# Patient Record
(demographics unavailable — no encounter records)

---

## 2024-10-24 NOTE — RESULTS/DATA
[TextEntry] : CT scan performed 10/12/2024 images and radiologist read reviewed, by my read demonstrating trace to small bilateral pleural effusions with associated basilar atelectasis.  There is also a mildly dilated pulmonary artery measuring up to 3.5 cm.  No evidence of pulmonary embolism.  Cardiomegaly.  Otherwise fairly normal-appearing pulmonary parenchyma bilaterally, very mild mosaicism throughout.

## 2024-10-24 NOTE — PHYSICAL EXAM
[No Acute Distress] : no acute distress [Normal Oropharynx] : normal oropharynx [Normal Appearance] : normal appearance [No Neck Mass] : no neck mass [Normal Rate/Rhythm] : normal rate/rhythm [Normal S1, S2] : normal s1, s2 [No Murmurs] : no murmurs [No Resp Distress] : no resp distress [No Abnormalities] : no abnormalities [Benign] : benign [Normal Gait] : normal gait [No Clubbing] : no clubbing [No Cyanosis] : no cyanosis [FROM] : FROM [2+ Pitting] : 2+ pitting [Normal Color/ Pigmentation] : normal color/ pigmentation [No Focal Deficits] : no focal deficits [Oriented x3] : oriented x3 [Normal Affect] : normal affect [TextBox_68] : R-sided expiratory squeak

## 2024-10-24 NOTE — HISTORY OF PRESENT ILLNESS
[Never] : never [TextBox_4] : Mr. LORENZO is a 58 year man with a medical history significant for ?COPD (no smoking history) and diastolic heart failure presenting today to the clinic for new oxygen dependence.    Patient recently presented to the hospital with worsening dyspnea on exertion and lower extremity edema.  In the emergency department he was found to have bilateral pleural effusions on imaging and elevated serum troponin, and BNP >5k.  Patient was found to have severe hypercapnia as well with evidence of chronic compensation.  He is now following with pulmonary for his chronic oxygen requirements and likely OHS/PARIS given his chronic hypercapnia.  Has changed his diet to bite size and is no longer choking on his food at all.  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 	( x ) Tiredness during the day? 	(  ) Observed overnight apnea? 	( x ) Pressure elevated? 	(  ) BMI > 35? 	( x ) Age > 50? 	(  ) Neck circumference >16in? 	( x ) Gender = male?

## 2024-11-07 NOTE — HISTORY OF PRESENT ILLNESS
[FreeTextEntry1] : The patient is referred to cardiology for treatment of hypertension. He was under the care of Dr. Santos which recently passed away. The patient denies exertional chest pain or exertional shortness of breath. He denies atherosclerotic heart disease, valvular heart disease or cardiomyopathy. He has risk factors for ischemic heart disease including COPD, smoking and history of hypertension. He denies TIA, CVA or PAD.  EKG is normal sinus rhythm

## 2024-11-07 NOTE — DISCUSSION/SUMMARY
[FreeTextEntry1] : Return visit in 1 month Start Hydralazine 100 mg twice a day DC verapamil 2D echo in the future.
[FreeTextEntry1] : Return visit in 1 month Start Hydralazine 100 mg twice a day DC verapamil 2D echo in the future.
7.5

## 2025-01-06 NOTE — HISTORY OF PRESENT ILLNESS
[Never] : never [TextBox_4] : Mr. LORENZO is a 58 year man with a medical history significant for ?COPD (no smoking history) and diastolic heart failure presenting today to the clinic for new oxygen dependence.    Patient recently presented to the hospital with worsening dyspnea on exertion and lower extremity edema.  In the emergency department he was found to have bilateral pleural effusions on imaging and elevated serum troponin, and BNP >5k.  Patient was found to have severe hypercapnia as well with evidence of chronic compensation.  He is now following with pulmonary for his chronic oxygen requirements and chronic hypercapnia.  Has changed his diet to bite size and is no longer choking on his food at all.  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 ABG demonstrated mild respiratory acidosis with metabolic alkalosis, normal pH.  There is an Aa gradient. PFT demonstrated severe restriction with moderate diffusion impairment. TTE with mild RV impairment